# Patient Record
Sex: MALE | Race: WHITE | NOT HISPANIC OR LATINO | Employment: FULL TIME | URBAN - METROPOLITAN AREA
[De-identification: names, ages, dates, MRNs, and addresses within clinical notes are randomized per-mention and may not be internally consistent; named-entity substitution may affect disease eponyms.]

---

## 2024-03-11 ENCOUNTER — OFFICE VISIT (OUTPATIENT)
Dept: MEDICAL GROUP | Age: 34
End: 2024-03-11
Payer: COMMERCIAL

## 2024-03-11 VITALS
OXYGEN SATURATION: 97 % | BODY MASS INDEX: 27.85 KG/M2 | TEMPERATURE: 97.1 F | HEART RATE: 73 BPM | SYSTOLIC BLOOD PRESSURE: 122 MMHG | WEIGHT: 217 LBS | HEIGHT: 74 IN | DIASTOLIC BLOOD PRESSURE: 60 MMHG

## 2024-03-11 DIAGNOSIS — Z11.59 NEED FOR HEPATITIS C SCREENING TEST: ICD-10-CM

## 2024-03-11 DIAGNOSIS — I49.3 FREQUENT PVCS: ICD-10-CM

## 2024-03-11 DIAGNOSIS — R94.31 ABNORMAL EKG: ICD-10-CM

## 2024-03-11 DIAGNOSIS — Z00.00 BLOOD TESTS FOR ROUTINE GENERAL PHYSICAL EXAMINATION: ICD-10-CM

## 2024-03-11 DIAGNOSIS — Z76.89 ESTABLISHING CARE WITH NEW DOCTOR, ENCOUNTER FOR: ICD-10-CM

## 2024-03-11 DIAGNOSIS — Z11.4 SCREENING FOR HIV (HUMAN IMMUNODEFICIENCY VIRUS): ICD-10-CM

## 2024-03-11 PROCEDURE — 99204 OFFICE O/P NEW MOD 45 MIN: CPT | Performed by: PHYSICIAN ASSISTANT

## 2024-03-11 PROCEDURE — 93000 ELECTROCARDIOGRAM COMPLETE: CPT | Performed by: PHYSICIAN ASSISTANT

## 2024-03-11 PROCEDURE — 3074F SYST BP LT 130 MM HG: CPT | Performed by: PHYSICIAN ASSISTANT

## 2024-03-11 PROCEDURE — 3078F DIAST BP <80 MM HG: CPT | Performed by: PHYSICIAN ASSISTANT

## 2024-03-11 ASSESSMENT — PATIENT HEALTH QUESTIONNAIRE - PHQ9: CLINICAL INTERPRETATION OF PHQ2 SCORE: 0

## 2024-03-11 NOTE — PROGRESS NOTES
"cc: Establish care, abnormal EKG    Subjective:     HPI  Luciano Calderon is a 34 y.o. male presenting to establish care.  He has never really had a primary care provider.  He just moved to the area from [place].  He is a highway patrolman.    He was going through medical clearance when he was applying for his job here.  He had EKG completed, per that provider he said it was abnormal, was advised to have stress test, and referral to cardiology.  He cannot work in the field until he has cardiac clearance by cardiology.  He exercises regularly, does not have any issues with exercise.  Denies chest pain, palpitations, shortness of breath, lower extremity edema, dizziness, syncope, diaphoresis, nausea, vomiting.        Review of systems:  See above.     No current outpatient medications on file.    Allergies, past medical history, past surgical history, family history, social history reviewed and updated    Objective:     Vitals: /60 (BP Location: Right arm, Patient Position: Sitting, BP Cuff Size: Large adult)   Pulse 73   Temp 36.2 °C (97.1 °F) (Temporal)   Ht 1.88 m (6' 2\")   Wt 98.4 kg (217 lb)   SpO2 97%   BMI 27.86 kg/m²   General: Alert, pleasant, NAD  HEENT: Normocephalic. Neck supple.  No thyromegaly or masses palpated. No cervical or supraclavicular lymphadenopathy. No carotid bruits   Heart: Regular rate, irregularly irregular.  S1 and S2 normal.  No murmurs appreciated.  Respiratory: Normal respiratory effort.  Clear to auscultation bilaterally.  Skin: Warm, dry, no rashes.  Extremities: No leg edema.  Radial pulses 2+ symmetric  Psych:  Affect/mood is normal, judgement is good, memory is intact, grooming is appropriate.    EKG Interpretation-HR is 75, sinus rhythm, multiple ventricular premature complexes.  No ST segment elevations or depressions        Assessment/Plan:     Luciano was seen today for [facility].    Diagnoses and all orders for this visit:    Frequent PVCs  -Asymptomatic.  " Does have frequent PVCs.  Will obtain Zio patch and echo .  Cardiology for cardiac clearance for work once all test results are completed.  Stress test as he needs this clearance through his work.  -     REFERRAL TO CARDIOLOGY  -     Cardiac Stress Test Treadmill Only; Future  -     EC-ECHOCARDIOGRAM COMPLETE W/O CONT; Future  -     RIH ZIO PATCH MONITOR; Future    Abnormal EKG  -See above  -     EKG  -     REFERRAL TO CARDIOLOGY  -     Cardiac Stress Test Treadmill Only; Future  -     EC-ECHOCARDIOGRAM COMPLETE W/O CONT; Future  -     RIH ZIO PATCH MONITOR; Future    Blood tests for routine general physical examination  -Will check below labs.  Further treatment if needed pending results  -     TSH WITH REFLEX TO FT4; Future  -     Lipid Profile; Future  -     Comp Metabolic Panel; Future  -     CBC WITH DIFFERENTIAL; Future    Establishing care with new doctor, encounter for  Previous records reviewed    Screening for HIV (human immunodeficiency virus)  -     HIV AG/AB COMBO ASSAY SCREENING; Future    Need for hepatitis C screening test  -     HEP C VIRUS ANTIBODY; Future        Return in about 4 weeks (around 4/8/2024) for Imaging, Lab Review.

## 2024-03-15 ENCOUNTER — HOSPITAL ENCOUNTER (OUTPATIENT)
Dept: LAB | Facility: MEDICAL CENTER | Age: 34
End: 2024-03-15
Attending: PHYSICIAN ASSISTANT
Payer: COMMERCIAL

## 2024-03-15 DIAGNOSIS — Z11.59 NEED FOR HEPATITIS C SCREENING TEST: ICD-10-CM

## 2024-03-15 DIAGNOSIS — Z00.00 BLOOD TESTS FOR ROUTINE GENERAL PHYSICAL EXAMINATION: ICD-10-CM

## 2024-03-15 DIAGNOSIS — Z11.4 SCREENING FOR HIV (HUMAN IMMUNODEFICIENCY VIRUS): ICD-10-CM

## 2024-03-15 LAB
ALBUMIN SERPL BCP-MCNC: 5 G/DL (ref 3.2–4.9)
ALBUMIN/GLOB SERPL: 1.9 G/DL
ALP SERPL-CCNC: 73 U/L (ref 30–99)
ALT SERPL-CCNC: 19 U/L (ref 2–50)
ANION GAP SERPL CALC-SCNC: 11 MMOL/L (ref 7–16)
AST SERPL-CCNC: 19 U/L (ref 12–45)
BASOPHILS # BLD AUTO: 1 % (ref 0–1.8)
BASOPHILS # BLD: 0.06 K/UL (ref 0–0.12)
BILIRUB SERPL-MCNC: 0.6 MG/DL (ref 0.1–1.5)
BUN SERPL-MCNC: 16 MG/DL (ref 8–22)
CALCIUM ALBUM COR SERPL-MCNC: 9.1 MG/DL (ref 8.5–10.5)
CALCIUM SERPL-MCNC: 9.9 MG/DL (ref 8.5–10.5)
CHLORIDE SERPL-SCNC: 102 MMOL/L (ref 96–112)
CHOLEST SERPL-MCNC: 212 MG/DL (ref 100–199)
CO2 SERPL-SCNC: 25 MMOL/L (ref 20–33)
CREAT SERPL-MCNC: 1.18 MG/DL (ref 0.5–1.4)
EOSINOPHIL # BLD AUTO: 0.12 K/UL (ref 0–0.51)
EOSINOPHIL NFR BLD: 2 % (ref 0–6.9)
ERYTHROCYTE [DISTWIDTH] IN BLOOD BY AUTOMATED COUNT: 38.8 FL (ref 35.9–50)
FASTING STATUS PATIENT QL REPORTED: NORMAL
GFR SERPLBLD CREATININE-BSD FMLA CKD-EPI: 83 ML/MIN/1.73 M 2
GLOBULIN SER CALC-MCNC: 2.7 G/DL (ref 1.9–3.5)
GLUCOSE SERPL-MCNC: 87 MG/DL (ref 65–99)
HCT VFR BLD AUTO: 48 % (ref 42–52)
HDLC SERPL-MCNC: 41 MG/DL
HGB BLD-MCNC: 16.4 G/DL (ref 14–18)
IMM GRANULOCYTES # BLD AUTO: 0.01 K/UL (ref 0–0.11)
IMM GRANULOCYTES NFR BLD AUTO: 0.2 % (ref 0–0.9)
LDLC SERPL CALC-MCNC: 152 MG/DL
LYMPHOCYTES # BLD AUTO: 1.97 K/UL (ref 1–4.8)
LYMPHOCYTES NFR BLD: 32 % (ref 22–41)
MCH RBC QN AUTO: 29.1 PG (ref 27–33)
MCHC RBC AUTO-ENTMCNC: 34.2 G/DL (ref 32.3–36.5)
MCV RBC AUTO: 85.3 FL (ref 81.4–97.8)
MONOCYTES # BLD AUTO: 0.41 K/UL (ref 0–0.85)
MONOCYTES NFR BLD AUTO: 6.7 % (ref 0–13.4)
NEUTROPHILS # BLD AUTO: 3.58 K/UL (ref 1.82–7.42)
NEUTROPHILS NFR BLD: 58.1 % (ref 44–72)
NRBC # BLD AUTO: 0 K/UL
NRBC BLD-RTO: 0 /100 WBC (ref 0–0.2)
PLATELET # BLD AUTO: 251 K/UL (ref 164–446)
PMV BLD AUTO: 10.7 FL (ref 9–12.9)
POTASSIUM SERPL-SCNC: 4.6 MMOL/L (ref 3.6–5.5)
PROT SERPL-MCNC: 7.7 G/DL (ref 6–8.2)
RBC # BLD AUTO: 5.63 M/UL (ref 4.7–6.1)
SODIUM SERPL-SCNC: 138 MMOL/L (ref 135–145)
TRIGL SERPL-MCNC: 93 MG/DL (ref 0–149)
WBC # BLD AUTO: 6.2 K/UL (ref 4.8–10.8)

## 2024-03-15 PROCEDURE — 84443 ASSAY THYROID STIM HORMONE: CPT

## 2024-03-15 PROCEDURE — 36415 COLL VENOUS BLD VENIPUNCTURE: CPT

## 2024-03-15 PROCEDURE — 85025 COMPLETE CBC W/AUTO DIFF WBC: CPT

## 2024-03-15 PROCEDURE — 80061 LIPID PANEL: CPT

## 2024-03-15 PROCEDURE — 87389 HIV-1 AG W/HIV-1&-2 AB AG IA: CPT

## 2024-03-15 PROCEDURE — 80053 COMPREHEN METABOLIC PANEL: CPT

## 2024-03-15 PROCEDURE — 86803 HEPATITIS C AB TEST: CPT

## 2024-03-16 LAB
HCV AB SER QL: NORMAL
HIV 1+2 AB+HIV1 P24 AG SERPL QL IA: NORMAL
TSH SERPL DL<=0.005 MIU/L-ACNC: 1.7 UIU/ML (ref 0.38–5.33)

## 2024-03-18 ENCOUNTER — NON-PROVIDER VISIT (OUTPATIENT)
Dept: CARDIOLOGY | Facility: MEDICAL CENTER | Age: 34
End: 2024-03-18
Attending: PHYSICIAN ASSISTANT
Payer: COMMERCIAL

## 2024-03-18 DIAGNOSIS — R94.31 ABNORMAL EKG: ICD-10-CM

## 2024-03-18 DIAGNOSIS — I49.3 FREQUENT PVCS: ICD-10-CM

## 2024-03-18 PROCEDURE — 93242 EXT ECG>48HR<7D RECORDING: CPT

## 2024-03-18 NOTE — PROGRESS NOTES
Patient enrolled in the 7 day o Holter monitoring program per Leda Rod PA-C.  >Office hook-up, serial # [id number].  >Currently pending EOS.

## 2024-03-26 ENCOUNTER — ANCILLARY PROCEDURE (OUTPATIENT)
Dept: CARDIOLOGY | Facility: MEDICAL CENTER | Age: 34
End: 2024-03-26
Attending: PHYSICIAN ASSISTANT
Payer: COMMERCIAL

## 2024-03-26 DIAGNOSIS — I49.3 FREQUENT PVCS: ICD-10-CM

## 2024-03-26 DIAGNOSIS — R94.31 ABNORMAL EKG: ICD-10-CM

## 2024-03-26 LAB
LV EJECT FRACT  99904: 58
LV EJECT FRACT MOD 2C 99903: 63.85
LV EJECT FRACT MOD 4C 99902: 53.4
LV EJECT FRACT MOD BP 99901: 58.84

## 2024-03-26 PROCEDURE — 93306 TTE W/DOPPLER COMPLETE: CPT

## 2024-03-26 PROCEDURE — 93306 TTE W/DOPPLER COMPLETE: CPT | Mod: 26 | Performed by: INTERNAL MEDICINE

## 2024-03-27 ENCOUNTER — HOSPITAL ENCOUNTER (OUTPATIENT)
Dept: RADIOLOGY | Facility: MEDICAL CENTER | Age: 34
End: 2024-03-27
Attending: PHYSICIAN ASSISTANT
Payer: COMMERCIAL

## 2024-03-27 DIAGNOSIS — I49.3 FREQUENT PVCS: ICD-10-CM

## 2024-03-27 DIAGNOSIS — R94.31 ABNORMAL ELECTROCARDIOGRAM: ICD-10-CM

## 2024-03-27 PROCEDURE — 93017 CV STRESS TEST TRACING ONLY: CPT

## 2024-03-27 PROCEDURE — 93018 CV STRESS TEST I&R ONLY: CPT | Performed by: INTERNAL MEDICINE

## 2024-03-29 ENCOUNTER — TELEPHONE (OUTPATIENT)
Dept: CARDIOLOGY | Facility: MEDICAL CENTER | Age: 34
End: 2024-03-29
Payer: COMMERCIAL

## 2024-04-11 ENCOUNTER — OFFICE VISIT (OUTPATIENT)
Dept: MEDICAL GROUP | Age: 34
End: 2024-04-11
Payer: COMMERCIAL

## 2024-04-11 VITALS
OXYGEN SATURATION: 97 % | TEMPERATURE: 97 F | HEART RATE: 59 BPM | DIASTOLIC BLOOD PRESSURE: 80 MMHG | HEIGHT: 74 IN | BODY MASS INDEX: 27.46 KG/M2 | SYSTOLIC BLOOD PRESSURE: 120 MMHG | WEIGHT: 214 LBS

## 2024-04-11 DIAGNOSIS — I49.3 FREQUENT PVCS: ICD-10-CM

## 2024-04-11 DIAGNOSIS — E78.5 DYSLIPIDEMIA: ICD-10-CM

## 2024-04-11 PROCEDURE — 3079F DIAST BP 80-89 MM HG: CPT | Performed by: PHYSICIAN ASSISTANT

## 2024-04-11 PROCEDURE — 99214 OFFICE O/P EST MOD 30 MIN: CPT | Performed by: PHYSICIAN ASSISTANT

## 2024-04-11 PROCEDURE — 3074F SYST BP LT 130 MM HG: CPT | Performed by: PHYSICIAN ASSISTANT

## 2024-04-11 ASSESSMENT — FIBROSIS 4 INDEX: FIB4 SCORE: 0.59

## 2024-04-11 NOTE — PROGRESS NOTES
cc: Follow-up imaging/lab review    Subjective:     HPI  Luciano Calderon is a 34 y.o. male presenting for follow-up imaging and lab review.  He establish care approximately 4 weeks ago.  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  Had EKG completed, was abnormal.  On EKG in clinic he had multiple PVCs.  Holter, echo and stress test were completed.  Echo and stress test unremarkable, did note frequent PVCs on stress test, however these did improve with exercise.  He does not notice his PVCs, is asymptomatic.  He does have follow-up appointment with cardiology tomorrow.  Denies chest pain, palpitations, shortness of breath, dizziness, syncope.    Cholesterol level is also moderately elevated.  He has since changed his diet since getting his labs back.  He was eating a lot more red meats, lots of eggs.  He has significantly cut back on this, eating leaner protein.  Exercising regularly.  Minimal alcohol use    Findings:   1. Predominant rhythm was sinus rhythm with an average heart rate of 72 bpm.   2.  There were no episodes of ventricular tachycardia.   3.  There was a rare episode of nonsustained supraventricular tachycardia lasting 20 beats in duration.   4.  No episodes of atrial fibrillation.   5.  No significant pauses or high-grade AV block.   6.  Rare PACs < 1.0%   7.  Frequent PVCs with a burden of 25.3%   8.  There were no patient triggered events/symptoms during the monitoring period.     Review of systems:  See above.    Latest Reference Range & Units 03/15/24 13:40   WBC 4.8 - 10.8 K/uL 6.2   RBC 4.70 - 6.10 M/uL 5.63   Hemoglobin 14.0 - 18.0 g/dL 16.4   Hematocrit 42.0 - 52.0 % 48.0   MCV 81.4 - 97.8 fL 85.3   MCH 27.0 - 33.0 pg 29.1   MCHC 32.3 - 36.5 g/dL 34.2   RDW 35.9 - 50.0 fL 38.8   Platelet Count 164 - 446 K/uL 251   MPV 9.0 - 12.9 fL 10.7   Neutrophils-Polys 44.00 - 72.00 % 58.10   Neutrophils (Absolute) 1.82 - 7.42 K/uL 3.58   Lymphocytes 22.00  "- 41.00 % 32.00   Lymphs (Absolute) 1.00 - 4.80 K/uL 1.97   Monocytes 0.00 - 13.40 % 6.70   Monos (Absolute) 0.00 - 0.85 K/uL 0.41   Eosinophils 0.00 - 6.90 % 2.00   Eos (Absolute) 0.00 - 0.51 K/uL 0.12   Basophils 0.00 - 1.80 % 1.00   Baso (Absolute) 0.00 - 0.12 K/uL 0.06   Immature Granulocytes 0.00 - 0.90 % 0.20   Immature Granulocytes (abs) 0.00 - 0.11 K/uL 0.01   Nucleated RBC 0.00 - 0.20 /100 WBC 0.00   NRBC (Absolute) K/uL 0.00   Sodium 135 - 145 mmol/L 138   Potassium 3.6 - 5.5 mmol/L 4.6   Chloride 96 - 112 mmol/L 102   Co2 20 - 33 mmol/L 25   Anion Gap 7.0 - 16.0  11.0   Glucose 65 - 99 mg/dL 87   Bun 8 - 22 mg/dL 16   Creatinine 0.50 - 1.40 mg/dL 1.18   GFR (CKD-EPI) >60 mL/min/1.73 m 2 83   Calcium 8.5 - 10.5 mg/dL 9.9   Correct Calcium 8.5 - 10.5 mg/dL 9.1   AST(SGOT) 12 - 45 U/L 19   ALT(SGPT) 2 - 50 U/L 19   Alkaline Phosphatase 30 - 99 U/L 73   Total Bilirubin 0.1 - 1.5 mg/dL 0.6   Albumin 3.2 - 4.9 g/dL 5.0 (H)   Total Protein 6.0 - 8.2 g/dL 7.7   Globulin 1.9 - 3.5 g/dL 2.7   A-G Ratio g/dL 1.9   Fasting Status  Fasting   Cholesterol,Tot 100 - 199 mg/dL 212 (H)   Triglycerides 0 - 149 mg/dL 93   HDL >=40 mg/dL 41   LDL <100 mg/dL 152 (H)   TSH 0.380 - 5.330 uIU/mL 1.700   Hepatitis C Antibody Non-Reactive  Non-Reactive   HIV Ag/Ab Combo Assay Non Reactive  Non-Reactive   (H): Data is abnormally high      No current outpatient medications on file.    Allergies, past medical history, past surgical history, family history, social history reviewed and updated    Objective:     Vitals: /80 (BP Location: Left arm, Patient Position: Sitting, BP Cuff Size: Adult)   Pulse (!) 59   Temp 36.1 °C (97 °F) (Temporal)   Ht 1.88 m (6' 2\")   Wt 97.1 kg (214 lb)   SpO2 97%   BMI 27.48 kg/m²   General: Alert, pleasant, NAD  HEENT: Normocephalic. Neck supple.  No carotid bruits   Heart: Regular rate and rhythm.  S1 and S2 normal.  No murmurs appreciated.  Respiratory: Normal respiratory effort.  Clear to " auscultation bilaterally.  Skin: Warm, dry, no rashes.  Psych:  Affect/mood is normal, judgement is good, memory is intact, grooming is appropriate.    Assessment/Plan:     Luciano was seen today for follow-up.    Diagnoses and all orders for this visit:    Frequent PVCs  Echo stress test unremarkable.  25% burden PVCs on Holter.  He continues to be asymptomatic.  As he is following up with cardiology, will defer management to cardiology.    Dyslipidemia  Moderately elevated.  He is working on-diet changes.  Continue with regular physical activity.  Monitor repeat labs again in 6 months.  -     Lipid Profile; Future        Return in about 6 months (around 10/11/2024) for Annual PX, Lab Review.

## 2024-04-12 ENCOUNTER — OFFICE VISIT (OUTPATIENT)
Dept: CARDIOLOGY | Facility: MEDICAL CENTER | Age: 34
End: 2024-04-12
Attending: PHYSICIAN ASSISTANT
Payer: COMMERCIAL

## 2024-04-12 VITALS
DIASTOLIC BLOOD PRESSURE: 76 MMHG | BODY MASS INDEX: 27.21 KG/M2 | OXYGEN SATURATION: 97 % | HEIGHT: 74 IN | SYSTOLIC BLOOD PRESSURE: 110 MMHG | HEART RATE: 57 BPM | RESPIRATION RATE: 16 BRPM | WEIGHT: 212 LBS

## 2024-04-12 DIAGNOSIS — I49.3 FREQUENT PVCS: ICD-10-CM

## 2024-04-12 DIAGNOSIS — E78.5 DYSLIPIDEMIA: ICD-10-CM

## 2024-04-12 LAB — EKG IMPRESSION: NORMAL

## 2024-04-12 PROCEDURE — 93010 ELECTROCARDIOGRAM REPORT: CPT | Performed by: INTERNAL MEDICINE

## 2024-04-12 PROCEDURE — 3078F DIAST BP <80 MM HG: CPT | Performed by: INTERNAL MEDICINE

## 2024-04-12 PROCEDURE — 99204 OFFICE O/P NEW MOD 45 MIN: CPT | Performed by: INTERNAL MEDICINE

## 2024-04-12 PROCEDURE — 3074F SYST BP LT 130 MM HG: CPT | Performed by: INTERNAL MEDICINE

## 2024-04-12 PROCEDURE — 99211 OFF/OP EST MAY X REQ PHY/QHP: CPT | Performed by: INTERNAL MEDICINE

## 2024-04-12 PROCEDURE — 93005 ELECTROCARDIOGRAM TRACING: CPT | Performed by: INTERNAL MEDICINE

## 2024-04-12 ASSESSMENT — ENCOUNTER SYMPTOMS
NERVOUS/ANXIOUS: 0
EYES NEGATIVE: 1
DIZZINESS: 0
NAUSEA: 0
SHORTNESS OF BREATH: 0
BLURRED VISION: 0
GASTROINTESTINAL NEGATIVE: 1
FOCAL WEAKNESS: 0
HEADACHES: 0
PALPITATIONS: 0
FEVER: 0
MYALGIAS: 0
DEPRESSION: 0
DOUBLE VISION: 0
ABDOMINAL PAIN: 0
RESPIRATORY NEGATIVE: 1
CONSTITUTIONAL NEGATIVE: 1
WEIGHT LOSS: 0
CHILLS: 0
COUGH: 0
VOMITING: 0
PSYCHIATRIC NEGATIVE: 1
CARDIOVASCULAR NEGATIVE: 1
NEUROLOGICAL NEGATIVE: 1
MUSCULOSKELETAL NEGATIVE: 1
BRUISES/BLEEDS EASILY: 0
WEAKNESS: 0
CLAUDICATION: 0

## 2024-04-12 ASSESSMENT — FIBROSIS 4 INDEX: FIB4 SCORE: 0.59

## 2024-04-12 NOTE — PROGRESS NOTES
Chief Complaint   Patient presents with    New Patient      Frequent PVCs, Abnormal EKG           Subjective     Luciano Calderon is a 34 y.o. male who presents today as a consult from Leda Joseph for premature ventricular contractions.     Thank you for allowing me to evaluate Mr. Calderon, who as you know is a 34 year old male with dyslipidemia, lifelong nonsmoker, no family history of coronary artery disease. He underwent Zio monitor which demonstrated frequent premature ventricular contractions with  25% burden. He is clinically doing well. He denies chest pain, shortness of breath, palpitations, nausea/vomiting or diaphoresis. He keeps active exercising. He works for [employer].    Past Medical History:   Diagnosis Date    Hyperlipidemia      Past Surgical History:   Procedure Laterality Date    KNEE RECONSTRUCTION Left 2019    MCL ACL     Family History   Problem Relation Age of Onset    No Known Problems Mother     No Known Problems Father     No Known Problems Sister     Heart Attack Maternal Grandmother     Kidney Disease Paternal Grandfather      Social History     Socioeconomic History    Marital status:      Spouse name: Not on file    Number of children: Not on file    Years of education: Not on file    Highest education level: Not on file   Occupational History    Not on file   Tobacco Use    Smoking status: Never    Smokeless tobacco: Never   Vaping Use    Vaping Use: Never used   Substance and Sexual Activity    Alcohol use: Yes     Comment: OCC    Drug use: Never    Sexual activity: Yes     Partners: Female     Birth control/protection: I.U.D.   Other Topics Concern    Not on file   Social History Narrative    Not on file     Social Determinants of Health     Financial Resource Strain: Not on file   Food Insecurity: Not on file   Transportation Needs: Not on file   Physical Activity: Not on file   Stress: Not on file   Social Connections: Not on file   Intimate Partner  "Violence: Not on file   Housing Stability: Not on file     Allergies   Allergen Reactions    Sulfa Drugs Hives and Rash     Hives, caused rash.     (Medications reviewed.)  No outpatient encounter medications on file as of 4/12/2024.     No facility-administered encounter medications on file as of 4/12/2024.     Review of Systems   Constitutional: Negative.  Negative for chills, fever, malaise/fatigue and weight loss.   HENT: Negative.  Negative for hearing loss.    Eyes: Negative.  Negative for blurred vision and double vision.   Respiratory: Negative.  Negative for cough and shortness of breath.    Cardiovascular: Negative.  Negative for chest pain, palpitations, claudication and leg swelling.   Gastrointestinal: Negative.  Negative for abdominal pain, nausea and vomiting.   Genitourinary: Negative.  Negative for dysuria and urgency.   Musculoskeletal: Negative.  Negative for joint pain and myalgias.   Skin: Negative.  Negative for itching and rash.   Neurological: Negative.  Negative for dizziness, focal weakness, weakness and headaches.   Endo/Heme/Allergies: Negative.  Does not bruise/bleed easily.   Psychiatric/Behavioral: Negative.  Negative for depression. The patient is not nervous/anxious.               Objective     /76 (BP Location: Left arm, Patient Position: Sitting, BP Cuff Size: Adult)   Pulse (!) 57   Resp 16   Ht 1.88 m (6' 2\")   Wt 96.2 kg (212 lb)   SpO2 97%   BMI 27.22 kg/m²     Physical Exam  Constitutional:       Appearance: Normal appearance. He is well-developed and normal weight.   HENT:      Head: Normocephalic and atraumatic.   Neck:      Vascular: No JVD.   Cardiovascular:      Rate and Rhythm: Normal rate and regular rhythm.      Heart sounds: Normal heart sounds.   Pulmonary:      Effort: Pulmonary effort is normal.      Breath sounds: Normal breath sounds.   Abdominal:      General: Bowel sounds are normal.      Palpations: Abdomen is soft.      Comments: No " hepatosplenomegaly.   Musculoskeletal:         General: Normal range of motion.   Lymphadenopathy:      Cervical: No cervical adenopathy.   Skin:     General: Skin is warm and dry.   Neurological:      Mental Status: He is alert and oriented to person, place, and time.            CARDIAC STUDIES/PROCEDURES:    ECHOCARDIOGRAM CONCLUSIONS (03/26/24)  No prior study is available for comparison.   Normal left ventricular systolic function.   No evidence of valvular abnormality based on Doppler evaluation.   (study result reviewed)     EXERCISE TREADMILL TEST (03/27/24)  Negative stress ECG for ischemia.   Duration (m:s): 12:24 METS: 12.7   (study result reviewed)     EKG was ordered for premature ventricular contractions, performed on (04/12/24) was reviewed: EKG, personally interpreted shows sinus rhythm with premature ventricular contractions.     Laboratory results of (03/15/24) were reviewed. Cholesterol profile of 212/93/41/152 mg/dL noted.     Procedures: Zio   Dates of monitoring: 3/18/2024 - 3/25/2024   Findings:   1. Predominant rhythm was sinus rhythm with an average heart rate of 72 bpm.   2.  There were no episodes of ventricular tachycardia.   3.  There was a rare episode of nonsustained supraventricular tachycardia lasting 20 beats in duration.   4.  No episodes of atrial fibrillation.   5.  No significant pauses or high-grade AV block.   6.  Rare PACs < 1.0%   7.  Frequent PVCs with a burden of 25.3%   8.  There were no patient triggered events/symptoms during the monitoring period.     Assessment & Plan     1. Frequent PVCs  EKG      2. Dyslipidemia            Medical Decision Making: Today's Assessment/Status/Plan:        Premature ventricular contractions: Frequent premature ventricular contractions are noted. We will refer to electrophysiologisy service.   Hyperlipidemia: Currently not well controlled on strict diet and exercise therapy. He is working on diet modification and exercise.    We will see  the patient as needed from general cardiology.    Thank you for this consult.

## 2024-04-15 ENCOUNTER — PATIENT MESSAGE (OUTPATIENT)
Dept: CARDIOLOGY | Facility: MEDICAL CENTER | Age: 34
End: 2024-04-15
Payer: COMMERCIAL

## 2024-04-15 NOTE — PATIENT COMMUNICATION
JI: Pt asking for clearance letter. Stress test and ECHO normal per his PCP. Requires clearance from cardiology. LV 4/14 with you, scheduled 5/10 with DS.    Thank you

## 2024-04-17 ENCOUNTER — OFFICE VISIT (OUTPATIENT)
Dept: CARDIOLOGY | Facility: MEDICAL CENTER | Age: 34
End: 2024-04-17
Attending: INTERNAL MEDICINE
Payer: COMMERCIAL

## 2024-04-17 ENCOUNTER — TELEPHONE (OUTPATIENT)
Dept: CARDIOLOGY | Facility: MEDICAL CENTER | Age: 34
End: 2024-04-17

## 2024-04-17 VITALS
HEIGHT: 74 IN | BODY MASS INDEX: 27.21 KG/M2 | SYSTOLIC BLOOD PRESSURE: 100 MMHG | DIASTOLIC BLOOD PRESSURE: 80 MMHG | RESPIRATION RATE: 16 BRPM | HEART RATE: 81 BPM | OXYGEN SATURATION: 95 % | WEIGHT: 212 LBS

## 2024-04-17 DIAGNOSIS — I49.3 FREQUENT PVCS: ICD-10-CM

## 2024-04-17 DIAGNOSIS — E78.5 DYSLIPIDEMIA: ICD-10-CM

## 2024-04-17 LAB — EKG IMPRESSION: NORMAL

## 2024-04-17 PROCEDURE — 99211 OFF/OP EST MAY X REQ PHY/QHP: CPT | Performed by: INTERNAL MEDICINE

## 2024-04-17 PROCEDURE — 93005 ELECTROCARDIOGRAM TRACING: CPT | Performed by: INTERNAL MEDICINE

## 2024-04-17 ASSESSMENT — FIBROSIS 4 INDEX: FIB4 SCORE: 0.59

## 2024-04-17 NOTE — TELEPHONE ENCOUNTER
----- Message from Benny Carbajal M.D. sent at 4/17/2024  3:12 PM PDT -----  Can you write him a letter stating that in our opinion he is cleared for any and all work activities and we do not think drug or procedural therapy for his PVCs are necessary currently. Let him know once letter is done and he can come .    Benny

## 2024-04-17 NOTE — PROGRESS NOTES
Arrhythmia Clinic Note (New Patient)    DOS: 4/17/2024    Referring physician: Jesus Durham    Chief complaint/Reason for consult: PVCs    HPI:  Pt is a 35 yo M. He has no significant medical history. 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 On 12 lead found to have frequent PVCs. Monitoring showing 25%, mostly unifocal, consistent with [facility]C source. Denies palpitations or chest discomfort. Underwent ischemic eval with treadmill testing and echo which were all normal.    ROS (+in BOLD):  Constitutional: Fevers/chills/fatigue/weightloss  HEENT: Blurry vision/eye pain/sore throat/hearing loss  Respiratory: Shortness of breath/cough  Cardiovascular: Chest pain/palpitations/edema/orthopnea/syncope  GI: Nausea/vomitting/diarrhea  MSK: Arthralgias/myagias/muscle weakness  Skin: Rash/sores  Neurological: Numbness/tremors/vertigo  Endocrine: Excessive thirst/polyuria/cold intolerance/heat intolerance  Psych: Depression/anxiety    Past Medical History:   Diagnosis Date    Hyperlipidemia        Past Surgical History:   Procedure Laterality Date    KNEE RECONSTRUCTION Left 2019    MCL ACL       Social History     Socioeconomic History    Marital status:      Spouse name: Not on file    Number of children: Not on file    Years of education: Not on file    Highest education level: Not on file   Occupational History    Not on file   Tobacco Use    Smoking status: Never    Smokeless tobacco: Never   Vaping Use    Vaping Use: Never used   Substance and Sexual Activity    Alcohol use: Yes     Comment: OCC    Drug use: Never    Sexual activity: Yes     Partners: Female     Birth control/protection: I.U.D.   Other Topics Concern    Not on file   Social History Narrative    Not on file     Social Determinants of Health     Financial Resource Strain: Not on file   Food Insecurity: Not on file   Transportation Needs: Not on file   Physical Activity: Not on file   Stress: Not on  "file   Social Connections: Not on file   Intimate Partner Violence: Not on file   Housing Stability: Not on file       Family History   Problem Relation Age of Onset    No Known Problems Mother     No Known Problems Father     No Known Problems Sister     Heart Attack Maternal Grandmother     Kidney Disease Paternal Grandfather        Allergies   Allergen Reactions    Sulfa Drugs Hives and Rash     Hives, caused rash.       No current outpatient medications on file.     No current facility-administered medications for this visit.       Physical Exam:  Vitals:    04/17/24 1419   BP: 100/80   BP Location: Right arm   Patient Position: Sitting   BP Cuff Size: Adult   Pulse: 81   Resp: 16   SpO2: 95%   Weight: 96.2 kg (212 lb)   Height: 1.88 m (6' 2\")     General appearance: NAD, conversant  Eyes: anicteric sclerae, no lid-lag; PERRLA  HENT: Atraumatic; moist mucous membranes, no ulcerations  Neck: Trachea midline; FROM, supple, no thyromegaly  Lungs: CTA, with normal respiratory effort and no intercostal retractions  CV: RRR, no MRGs, no JVD  Abdomen: Soft, non-tender; normal bowel sounds, no HSM  Extremities: No peripheral edema. No clubbing or cyanosis.  Skin: Normal temperature, turgor and texture; no rash or ulcers  Psych: Appropriate affect, alert and oriented to person, place and time    Data:  Labs reviewed    Prior echo/stress reviewed:  Normal echo and ETT without evidence of ischemia    EKG interpreted by me:  Sinus PVC    Impression/Plan:  1. Dyslipidemia  EKG      2. 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

## 2024-05-09 LAB — EKG IMPRESSION: NORMAL

## 2024-10-01 ENCOUNTER — APPOINTMENT (OUTPATIENT)
Dept: CARDIOLOGY | Facility: MEDICAL CENTER | Age: 34
End: 2024-10-01
Attending: INTERNAL MEDICINE
Payer: COMMERCIAL

## 2024-11-11 ENCOUNTER — APPOINTMENT (OUTPATIENT)
Dept: CARDIOLOGY | Facility: MEDICAL CENTER | Age: 34
End: 2024-11-11
Attending: INTERNAL MEDICINE
Payer: COMMERCIAL

## 2025-01-07 ENCOUNTER — OFFICE VISIT (OUTPATIENT)
Dept: CARDIOLOGY | Facility: MEDICAL CENTER | Age: 35
End: 2025-01-07
Attending: INTERNAL MEDICINE
Payer: COMMERCIAL

## 2025-01-07 VITALS
RESPIRATION RATE: 16 BRPM | HEART RATE: 70 BPM | HEIGHT: 74 IN | SYSTOLIC BLOOD PRESSURE: 130 MMHG | BODY MASS INDEX: 26.77 KG/M2 | WEIGHT: 208.6 LBS | OXYGEN SATURATION: 99 % | DIASTOLIC BLOOD PRESSURE: 82 MMHG

## 2025-01-07 DIAGNOSIS — E78.5 DYSLIPIDEMIA: ICD-10-CM

## 2025-01-07 DIAGNOSIS — I49.3 FREQUENT PVCS: ICD-10-CM

## 2025-01-07 LAB — EKG IMPRESSION: NORMAL

## 2025-01-07 PROCEDURE — 93005 ELECTROCARDIOGRAM TRACING: CPT | Mod: TC | Performed by: INTERNAL MEDICINE

## 2025-01-07 PROCEDURE — 99214 OFFICE O/P EST MOD 30 MIN: CPT | Performed by: INTERNAL MEDICINE

## 2025-01-07 PROCEDURE — 99211 OFF/OP EST MAY X REQ PHY/QHP: CPT | Performed by: INTERNAL MEDICINE

## 2025-01-07 PROCEDURE — 3079F DIAST BP 80-89 MM HG: CPT | Performed by: INTERNAL MEDICINE

## 2025-01-07 PROCEDURE — 3075F SYST BP GE 130 - 139MM HG: CPT | Performed by: INTERNAL MEDICINE

## 2025-01-07 ASSESSMENT — FIBROSIS 4 INDEX: FIB4 SCORE: 0.59

## 2025-01-07 NOTE — PROGRESS NOTES
Arrhythmia Clinic Note (Established patient)    DOS: 1/7/2025    Chief complaint/Reason for consult: F/u PVCs    Interval History:  Pt is a 35 yo M. History of frequent unifocal (AMC region) PVCs (~25%). Asymptomatic. No treatment currently. Here for follow-up. He quit his job at the [employer], doing [employer] work in [place].    ROS (+ highlighted in red):  General--Negative for fatigue, weight loss or weight gain  Cardiovascular--Negative for CP, orthopnea, PND    Past Medical History:   Diagnosis Date    Hyperlipidemia        Past Surgical History:   Procedure Laterality Date    KNEE RECONSTRUCTION Left 2019    MCL ACL       Social History     Socioeconomic History    Marital status:      Spouse name: Not on file    Number of children: Not on file    Years of education: Not on file    Highest education level: Not on file   Occupational History    Not on file   Tobacco Use    Smoking status: Never    Smokeless tobacco: Never   Vaping Use    Vaping status: Never Used   Substance and Sexual Activity    Alcohol use: Yes     Comment: OCC    Drug use: Never    Sexual activity: Yes     Partners: Female     Birth control/protection: I.U.D.   Other Topics Concern    Not on file   Social History Narrative    Not on file     Social Drivers of Health     Financial Resource Strain: Not on file   Food Insecurity: Not on file   Transportation Needs: Not on file   Physical Activity: Not on file   Stress: Not on file   Social Connections: Not on file   Intimate Partner Violence: Not on file   Housing Stability: Not on file       Family History   Problem Relation Age of Onset    No Known Problems Mother     No Known Problems Father     No Known Problems Sister     Heart Attack Maternal Grandmother     Kidney Disease Paternal Grandfather        Allergies   Allergen Reactions    Sulfa Drugs Hives and Rash     Hives, caused rash.       No current outpatient medications on file.     No current  "facility-administered medications for this visit.       Physical Exam:  Vitals:    01/07/25 1236   BP: 130/82   BP Location: Left arm   Patient Position: Sitting   BP Cuff Size: Adult   Pulse: 70   Resp: 16   SpO2: 99%   Weight: 94.6 kg (208 lb 9.6 oz)   Height: 1.88 m (6' 2\")     General appearance: NAD, conversant  HEENT: PERRL, neck is supple with FROM  Lungs: Clear to auscultation, normal respiratory effort  CV: irregular, no murmurs/rubs/gallops, no JVD  Abdomen: Soft, non-tender with normal bowel sounds  Extremities: No peripheral edema, no clubbing or cyanosis  Skin: No rash, lesions, or ulcers  Psych: Alert and oriented to person, place and time    Data:  Labs reviewed    Prior echo/stress reviewed:  Preserved LV function    EKG interpreted by me:  Sinus PVCs    Impression/Plan:  1. Dyslipidemia  EKG      2. Frequent PVCs  EC-ECHOCARDIOGRAM COMPLETE W/ CONT        -Plan for repeat echo to monitor LV systolic function  -If normal f/u in 12 mo  -If decline in LV systolic function consider catheter ablation for PVCs    Benny Carbajal MD    "

## 2025-01-30 ENCOUNTER — APPOINTMENT (OUTPATIENT)
Dept: CARDIOLOGY | Facility: MEDICAL CENTER | Age: 35
End: 2025-01-30
Attending: INTERNAL MEDICINE
Payer: COMMERCIAL

## 2025-02-11 ENCOUNTER — HOSPITAL ENCOUNTER (OUTPATIENT)
Dept: CARDIOLOGY | Facility: MEDICAL CENTER | Age: 35
End: 2025-02-11
Attending: INTERNAL MEDICINE
Payer: COMMERCIAL

## 2025-02-11 DIAGNOSIS — I49.3 FREQUENT PVCS: ICD-10-CM

## 2025-02-11 PROCEDURE — 93306 TTE W/DOPPLER COMPLETE: CPT

## 2025-02-11 PROCEDURE — 700117 HCHG RX CONTRAST REV CODE 255: Performed by: INTERNAL MEDICINE

## 2025-02-11 RX ADMIN — HUMAN ALBUMIN MICROSPHERES AND PERFLUTREN 3 ML: 10; .22 INJECTION, SOLUTION INTRAVENOUS at 09:00

## 2025-02-19 LAB
LV EJECT FRACT  99904: 55
LV EJECT FRACT MOD 2C 99903: 55.95
LV EJECT FRACT MOD 4C 99902: 49.12
LV EJECT FRACT MOD BP 99901: 52.39

## 2025-02-19 PROCEDURE — 93306 TTE W/DOPPLER COMPLETE: CPT | Mod: 26 | Performed by: INTERNAL MEDICINE

## 2025-02-20 ENCOUNTER — RESULTS FOLLOW-UP (OUTPATIENT)
Dept: CARDIOLOGY | Facility: MEDICAL CENTER | Age: 35
End: 2025-02-20

## 2025-04-20 LAB — EKG IMPRESSION: NORMAL
